# Patient Record
Sex: FEMALE | Race: WHITE | NOT HISPANIC OR LATINO | Employment: PART TIME | URBAN - METROPOLITAN AREA
[De-identification: names, ages, dates, MRNs, and addresses within clinical notes are randomized per-mention and may not be internally consistent; named-entity substitution may affect disease eponyms.]

---

## 2017-05-11 ENCOUNTER — CONSULT (OUTPATIENT)
Dept: CARDIOLOGY | Facility: CLINIC | Age: 62
End: 2017-05-11

## 2017-05-11 ENCOUNTER — HOSPITAL ENCOUNTER (OUTPATIENT)
Dept: CARDIOLOGY | Facility: HOSPITAL | Age: 62
Discharge: HOME | End: 2017-05-11
Admitting: NURSE PRACTITIONER

## 2017-05-11 VITALS
SYSTOLIC BLOOD PRESSURE: 142 MMHG | HEART RATE: 60 BPM | BODY MASS INDEX: 26.66 KG/M2 | DIASTOLIC BLOOD PRESSURE: 92 MMHG | HEIGHT: 65 IN | WEIGHT: 160 LBS

## 2017-05-11 DIAGNOSIS — I77.9 CAROTID DISEASE, BILATERAL (HCC): ICD-10-CM

## 2017-05-11 DIAGNOSIS — I10 ESSENTIAL HYPERTENSION: ICD-10-CM

## 2017-05-11 DIAGNOSIS — I77.9 CAROTID DISEASE, BILATERAL (HCC): Primary | ICD-10-CM

## 2017-05-11 LAB
BH CV XLRA MEAS LEFT DIST CCA EDV: 30 CM/SEC
BH CV XLRA MEAS LEFT DIST CCA PSV: 77 CM/SEC
BH CV XLRA MEAS LEFT DIST ICA EDV: 31 CM/SEC
BH CV XLRA MEAS LEFT DIST ICA PSV: 72 CM/SEC
BH CV XLRA MEAS LEFT ICA/CCA RATIO: 1.1
BH CV XLRA MEAS LEFT MID ICA EDV: 35 CM/SEC
BH CV XLRA MEAS LEFT MID ICA PSV: 87 CM/SEC
BH CV XLRA MEAS LEFT PROX CCA EDV: 25 CM/SEC
BH CV XLRA MEAS LEFT PROX CCA PSV: 85 CM/SEC
BH CV XLRA MEAS LEFT PROX ECA EDV: 13 CM/SEC
BH CV XLRA MEAS LEFT PROX ECA PSV: 56 CM/SEC
BH CV XLRA MEAS LEFT PROX ICA EDV: 28 CM/SEC
BH CV XLRA MEAS LEFT PROX ICA PSV: 84 CM/SEC
BH CV XLRA MEAS LEFT PROX SCLA EDV: 1.6 CM/SEC
BH CV XLRA MEAS LEFT PROX SCLA PSV: 123 CM/SEC
BH CV XLRA MEAS LEFT VERTEBRAL A EDV: 20 CM/SEC
BH CV XLRA MEAS LEFT VERTEBRAL A PSV: 51 CM/SEC
BH CV XLRA MEAS RIGHT DIST CCA EDV: 25 CM/SEC
BH CV XLRA MEAS RIGHT DIST CCA PSV: 62 CM/SEC
BH CV XLRA MEAS RIGHT DIST ICA EDV: 32 CM/SEC
BH CV XLRA MEAS RIGHT DIST ICA PSV: 80 CM/SEC
BH CV XLRA MEAS RIGHT ICA/CCA RATIO: 1
BH CV XLRA MEAS RIGHT MID ICA EDV: 32 CM/SEC
BH CV XLRA MEAS RIGHT MID ICA PSV: 65 CM/SEC
BH CV XLRA MEAS RIGHT PROX CCA EDV: 25 CM/SEC
BH CV XLRA MEAS RIGHT PROX CCA PSV: 97 CM/SEC
BH CV XLRA MEAS RIGHT PROX ECA EDV: 12 CM/SEC
BH CV XLRA MEAS RIGHT PROX ECA PSV: 50 CM/SEC
BH CV XLRA MEAS RIGHT PROX ICA EDV: 27 CM/SEC
BH CV XLRA MEAS RIGHT PROX ICA PSV: 64 CM/SEC
BH CV XLRA MEAS RIGHT PROX SCLA EDV: 0.79 CM/SEC
BH CV XLRA MEAS RIGHT PROX SCLA PSV: 98 CM/SEC
BH CV XLRA MEAS RIGHT VERTEBRAL A EDV: 19 CM/SEC
BH CV XLRA MEAS RIGHT VERTEBRAL A PSV: 48 CM/SEC
LEFT ARM BP: NORMAL MMHG
RIGHT ARM BP: NORMAL MMHG

## 2017-05-11 PROCEDURE — 99204 OFFICE O/P NEW MOD 45 MIN: CPT | Performed by: INTERNAL MEDICINE

## 2017-05-11 PROCEDURE — 93880 EXTRACRANIAL BILAT STUDY: CPT

## 2017-05-11 PROCEDURE — 93880 EXTRACRANIAL BILAT STUDY: CPT | Performed by: INTERNAL MEDICINE

## 2017-05-11 PROCEDURE — 93000 ELECTROCARDIOGRAM COMPLETE: CPT | Performed by: INTERNAL MEDICINE

## 2017-05-11 RX ORDER — SIMVASTATIN 10 MG
10 TABLET ORAL NIGHTLY
COMMUNITY
End: 2022-05-10 | Stop reason: SDDI

## 2017-05-11 RX ORDER — ASPIRIN 81 MG/1
81 TABLET ORAL DAILY
COMMUNITY

## 2017-05-11 RX ORDER — LEVOTHYROXINE SODIUM 0.05 MG/1
50 TABLET ORAL DAILY
COMMUNITY

## 2017-05-11 RX ORDER — MELOXICAM 7.5 MG/1
7.5 TABLET ORAL DAILY
COMMUNITY
End: 2022-09-28 | Stop reason: SDDI

## 2017-05-11 RX ORDER — CARVEDILOL 6.25 MG/1
6.25 TABLET ORAL 2 TIMES DAILY WITH MEALS
COMMUNITY
End: 2022-09-28

## 2017-05-11 RX ORDER — ESTRADIOL 1 MG/1
1 TABLET ORAL DAILY
COMMUNITY

## 2022-05-09 NOTE — PROGRESS NOTES
[facility] Cardiology    Patient ID: Halie Price is a 67 y.o. female.  : 1955   Contact: [phone]    Encounter date: 05/10/2022    PCP: Jorge Motley MD      Chief complaint:   Chief Complaint   Patient presents with   • Hypertension   • Carotid Dz       Problem List:  1. Episodes of vibration underneath the left breast x4 days, 2014.  2. Hypertension.  a.  Echocardiogram, 2009, Dr. Hawkins:  LVEF 65% with mild MR and mild TR.  Normal cardiac chamber dimensions.  No pericardial effusion.  b. Stress Myoview, 2009:  Exercise duration of 7 minutes and 6 seconds with no EKG changes.  No evidence  of inducible ischemia.  No arrhythmia.  3. Mild carotid artery disease.  a. Carotid duplex, 2009:  A 30% LICA stenosis and 30% to 40% OSMIN stenosis.  b. Carotid duplex, 17: 0-49% stenosis to bilateral carotids. Minimal carotid artery disease, < 15%.   4. Palpitations.  5. Bilateral toe pain.  6. Surgical history.  a. Hysterectomy, 2005.  b. Right total hip replacement, 2007.  c. Redo right total hip replacement, 2011.    Allergies   Allergen Reactions   • Sulfa Antibiotics        Current Medications:    Current Outpatient Medications:   •  aspirin 81 MG EC tablet, Take 81 mg by mouth Daily., Disp: , Rfl:   •  estradiol (ESTRACE) 1 MG tablet, Take 1 mg by mouth Daily., Disp: , Rfl:   •  levothyroxine (SYNTHROID, LEVOTHROID) 50 MCG tablet, Take 50 mcg by mouth Daily., Disp: , Rfl:   •  Loratadine 10 MG capsule, Take 1 capsule by mouth Daily., Disp: , Rfl:   •  losartan (COZAAR) 50 MG tablet, , Disp: , Rfl:   •  meloxicam (MOBIC) 7.5 MG tablet, Take 7.5 mg by mouth Daily., Disp: , Rfl:   •  rosuvastatin (CRESTOR) 10 MG tablet, , Disp: , Rfl:   •  carvedilol (COREG) 6.25 MG tablet, Take 6.25 mg by mouth 2 (Two) Times a Day With Meals., Disp: , Rfl:   •  simvastatin (ZOCOR) 10 MG tablet, Take 10 mg by mouth Every Night., Disp: ,  "Rfl:     [facility]    Halie Price is a 67 y.o. female who presents today for a follow up of bilateral carotid disease and cardiac risk factors. Since last visit, patient has been doing well overall from a cardiovascular standpoint. She saw Dr. Rowe last year, but states that she but did not get much clarification on the test that were ordered.  She reports that she had an echocardiogram with him, but is unsure of the results-data deficit. She has not had a stress test. She experiences exertional dyspnea. She reports that her upper eyelids have started to be swollen when she wakes in the morning. She reports that she experiences mild lower extremity edema.  Her brother had congestive heart failure and stent placement as well as carotid surgery.  She is concerned because he has had similar symptoms at times.  She also reports that her toes will \"feel like they're going to fall off.\" She reports that she will walk on the treadmill, and can do 20 minutes at a time.  When she walks at 2.5 mph, but is unable to go faster due to hip pain. She experiences bruising in her ankles. Patient denies chest pain, orthopnea, palpitations, dizziness, and syncope. She has had no interim ER visits, hospitalizations, serious illnesses, or surgeries.  Dr. Motley follows her cholesterol levels. Patient expresses concern about her legs.      The following portions of the patient's history were reviewed and updated as appropriate: allergies, current medications and problem list.    Pertinent positives as listed in the HPI.  All other systems reviewed are negative.         Vitals:    05/10/22 1105   BP: 112/62   BP Location: Right arm   Patient Position: Sitting   Pulse: 66   SpO2: 95%   Weight: 71.7 kg (158 lb)   Height: 165.1 cm (65\")       Physical Exam:  General: Alert and oriented.  Neck: Jugular venous pressure is within normal limits. Carotids have normal upstrokes without bruits.   Cardiovascular: Heart has a nondisplaced focal " PMI. Regular rate and rhythm. No murmur, gallop or rub.  Lungs: Clear, no rales or wheezes. Equal expansion is noted.   Extremities: Show no edema.  Nonpalpable pulses but feet are warm  Skin: Warm and dry.  Neurologic: Nonfocal.     Diagnostic Data (reviewed with patient):    No recent laboratory studies available for review today.      ECG 12 Lead    Date/Time: 5/10/2022 12:12 PM  Performed by: Donna Major MD  Authorized by: Donna Major MD   Comparison: compared with previous ECG from 5/11/2017  Similar to previous ECG  Rhythm: sinus rhythm  Rate: normal  BPM: 65    Clinical impression: abnormal EKG  Comments: Cannot rule out Anterior infarct, age undetermined.  QTc 432 ms              Assessment:    ICD-10-CM ICD-9-CM   1. Bilateral carotid artery disease, unspecified type (HCC)  I77.9 447.9   2. Primary hypertension  I10 401.9   3. Mixed hyperlipidemia  E78.2 272.2         Plan:  1. Exercise SAM today as the pulses are nonpalpable.    2. Obtain outside echocardiogram  3. Patient is encouraged to attempt aerobic exercise at least 30 minutes daily, 4-5 days per week.   4. Continue on simvastatin 10 mg daily for hyperlipidemia.   5. Continue on aspirin 81 mg for antiplatelet therapy.   6. Continue on carvedilol 6.25 mg BID for hypertension.   7. Continue all other current medications.  8. F/up in 3 months, sooner if needed.      Scribed for Donna Major MD by Bastheva Arita. 5/10/2022  11:28 EDT     I Donna Major MD personally performed the services described in this documentation as scribed by the above individual in my presence, and it is both accurate and complete.    Donna Major MD, [facility]

## 2022-05-10 ENCOUNTER — HOSPITAL ENCOUNTER (OUTPATIENT)
Dept: CARDIOLOGY | Facility: HOSPITAL | Age: 67
Discharge: HOME | End: 2022-05-10
Admitting: INTERNAL MEDICINE

## 2022-05-10 ENCOUNTER — OFFICE VISIT (OUTPATIENT)
Dept: CARDIOLOGY | Facility: CLINIC | Age: 67
End: 2022-05-10

## 2022-05-10 VITALS
HEIGHT: 65 IN | OXYGEN SATURATION: 95 % | DIASTOLIC BLOOD PRESSURE: 62 MMHG | SYSTOLIC BLOOD PRESSURE: 112 MMHG | BODY MASS INDEX: 26.33 KG/M2 | WEIGHT: 158 LBS | HEART RATE: 66 BPM

## 2022-05-10 VITALS — BODY MASS INDEX: 26.34 KG/M2 | WEIGHT: 158.07 LBS | HEIGHT: 65 IN

## 2022-05-10 DIAGNOSIS — I73.9 CLAUDICATION: ICD-10-CM

## 2022-05-10 DIAGNOSIS — R09.89 DIMINISHED PULSES IN LOWER EXTREMITY: ICD-10-CM

## 2022-05-10 DIAGNOSIS — I77.9 BILATERAL CAROTID ARTERY DISEASE, UNSPECIFIED TYPE: Primary | ICD-10-CM

## 2022-05-10 DIAGNOSIS — E78.2 MIXED HYPERLIPIDEMIA: ICD-10-CM

## 2022-05-10 DIAGNOSIS — R00.2 PALPITATIONS: ICD-10-CM

## 2022-05-10 DIAGNOSIS — I10 PRIMARY HYPERTENSION: ICD-10-CM

## 2022-05-10 LAB
BH CV LOWER ARTERIAL LEFT ABI RATIO: 1.07
BH CV LOWER ARTERIAL LEFT DORSALIS PEDIS SYS MAX: 173
BH CV LOWER ARTERIAL LEFT GREAT TOE SYS MAX: 59
BH CV LOWER ARTERIAL LEFT POST TIBIAL SYS MAX: 167
BH CV LOWER ARTERIAL LEFT TBI RATIO: 0.37
BH CV LOWER ARTERIAL RIGHT ABI RATIO: 1.03
BH CV LOWER ARTERIAL RIGHT DORSALIS PEDIS SYS MAX: 166
BH CV LOWER ARTERIAL RIGHT GREAT TOE SYS MAX: 98
BH CV LOWER ARTERIAL RIGHT POST TIBIAL SYS MAX: 155
BH CV LOWER ARTERIAL RIGHT TBI RATIO: 0.61
MAXIMAL PREDICTED HEART RATE: 154 BPM
STRESS TARGET HR: 131 BPM
UPPER ARTERIAL LEFT ARM BRACHIAL SYS MAX: 157
UPPER ARTERIAL RIGHT ARM BRACHIAL SYS MAX: 161

## 2022-05-10 PROCEDURE — 99204 OFFICE O/P NEW MOD 45 MIN: CPT | Performed by: INTERNAL MEDICINE

## 2022-05-10 PROCEDURE — 93000 ELECTROCARDIOGRAM COMPLETE: CPT | Performed by: INTERNAL MEDICINE

## 2022-05-10 PROCEDURE — 93922 UPR/L XTREMITY ART 2 LEVELS: CPT

## 2022-05-10 PROCEDURE — 93922 UPR/L XTREMITY ART 2 LEVELS: CPT | Performed by: INTERNAL MEDICINE

## 2022-05-10 RX ORDER — LORATADINE 10 MG/1
1 CAPSULE, LIQUID FILLED ORAL DAILY
COMMUNITY

## 2022-05-10 RX ORDER — LOSARTAN POTASSIUM 50 MG/1
TABLET ORAL
COMMUNITY
Start: 2022-05-06 | End: 2022-09-28 | Stop reason: SDUPTHER

## 2022-05-10 RX ORDER — ROSUVASTATIN CALCIUM 10 MG/1
TABLET, COATED ORAL
COMMUNITY
Start: 2022-05-06

## 2022-05-20 ENCOUNTER — TELEPHONE (OUTPATIENT)
Dept: CARDIOLOGY | Facility: CLINIC | Age: 67
End: 2022-05-20

## 2022-09-02 ENCOUNTER — APPOINTMENT (OUTPATIENT)
Dept: CARDIOLOGY | Facility: HOSPITAL | Age: 67
End: 2022-09-02

## 2022-09-16 ENCOUNTER — HOSPITAL ENCOUNTER (OUTPATIENT)
Dept: CARDIOLOGY | Facility: HOSPITAL | Age: 67
Discharge: HOME | End: 2022-09-16
Admitting: INTERNAL MEDICINE

## 2022-09-16 VITALS — WEIGHT: 158 LBS | BODY MASS INDEX: 26.33 KG/M2 | HEIGHT: 65 IN

## 2022-09-16 DIAGNOSIS — I10 PRIMARY HYPERTENSION: ICD-10-CM

## 2022-09-16 DIAGNOSIS — R00.2 PALPITATIONS: ICD-10-CM

## 2022-09-16 LAB
BH CV ECHO MEAS - AO MAX PG: 6.2 MMHG
BH CV ECHO MEAS - AO MEAN PG: 3 MMHG
BH CV ECHO MEAS - AO ROOT DIAM: 3.5 CM
BH CV ECHO MEAS - AO V2 MAX: 124 CM/SEC
BH CV ECHO MEAS - AO V2 VTI: 28 CM
BH CV ECHO MEAS - AVA(I,D): 1.99 CM2
BH CV ECHO MEAS - EDV(CUBED): 85.2 ML
BH CV ECHO MEAS - EDV(MOD-SP2): 64.9 ML
BH CV ECHO MEAS - EDV(MOD-SP4): 117 ML
BH CV ECHO MEAS - EF(MOD-BP): 56.4 %
BH CV ECHO MEAS - EF(MOD-SP2): 56.7 %
BH CV ECHO MEAS - EF(MOD-SP4): 56.3 %
BH CV ECHO MEAS - ESV(CUBED): 19.7 ML
BH CV ECHO MEAS - ESV(MOD-SP2): 28.1 ML
BH CV ECHO MEAS - ESV(MOD-SP4): 51.1 ML
BH CV ECHO MEAS - FS: 38.6 %
BH CV ECHO MEAS - IVS/LVPW: 0.67 CM
BH CV ECHO MEAS - IVSD: 0.8 CM
BH CV ECHO MEAS - LA DIMENSION: 3 CM
BH CV ECHO MEAS - LAT PEAK E' VEL: 11.9 CM/SEC
BH CV ECHO MEAS - LV DIASTOLIC VOL/BSA (35-75): 65.4 CM2
BH CV ECHO MEAS - LV MASS(C)D: 147.8 GRAMS
BH CV ECHO MEAS - LV MAX PG: 2.6 MMHG
BH CV ECHO MEAS - LV MEAN PG: 1 MMHG
BH CV ECHO MEAS - LV SYSTOLIC VOL/BSA (12-30): 28.6 CM2
BH CV ECHO MEAS - LV V1 MAX: 81.2 CM/SEC
BH CV ECHO MEAS - LV V1 VTI: 17.7 CM
BH CV ECHO MEAS - LVIDD: 4.4 CM
BH CV ECHO MEAS - LVIDS: 2.7 CM
BH CV ECHO MEAS - LVOT AREA: 3.1 CM2
BH CV ECHO MEAS - LVOT DIAM: 2 CM
BH CV ECHO MEAS - LVPWD: 1.2 CM
BH CV ECHO MEAS - MED PEAK E' VEL: 9.3 CM/SEC
BH CV ECHO MEAS - MV A MAX VEL: 79.1 CM/SEC
BH CV ECHO MEAS - MV DEC SLOPE: 271 CM/SEC2
BH CV ECHO MEAS - MV E MAX VEL: 73.9 CM/SEC
BH CV ECHO MEAS - MV E/A: 0.93
BH CV ECHO MEAS - MV MAX PG: 4.6 MMHG
BH CV ECHO MEAS - MV MEAN PG: 2 MMHG
BH CV ECHO MEAS - MV P1/2T: 98.5 MSEC
BH CV ECHO MEAS - MV V2 VTI: 29.5 CM
BH CV ECHO MEAS - MVA(P1/2T): 2.23 CM2
BH CV ECHO MEAS - MVA(VTI): 1.88 CM2
BH CV ECHO MEAS - PA ACC TIME: 0.12 SEC
BH CV ECHO MEAS - PA PR(ACCEL): 25 MMHG
BH CV ECHO MEAS - SI(MOD-SP2): 20.6 ML/M2
BH CV ECHO MEAS - SI(MOD-SP4): 36.8 ML/M2
BH CV ECHO MEAS - SV(LVOT): 55.6 ML
BH CV ECHO MEAS - SV(MOD-SP2): 36.8 ML
BH CV ECHO MEAS - SV(MOD-SP4): 65.9 ML
BH CV ECHO MEAS - TAPSE (>1.6): 1.3 CM
BH CV ECHO MEAS - TR MAX PG: 22 MMHG
BH CV ECHO MEAS - TR MAX VEL: 234.5 CM/SEC
BH CV ECHO MEASUREMENTS AVERAGE E/E' RATIO: 6.97
BH CV VAS BP LEFT ARM: NORMAL MMHG
BH CV XLRA - RV BASE: 3.2 CM
BH CV XLRA - RV LENGTH: 6 CM
BH CV XLRA - RV MID: 2.7 CM
BH CV XLRA - TDI S': 16.1 CM/SEC
LEFT ATRIUM VOLUME INDEX: 15.7 ML/M2
MAXIMAL PREDICTED HEART RATE: 154 BPM
STRESS TARGET HR: 131 BPM

## 2022-09-16 PROCEDURE — 93306 TTE W/DOPPLER COMPLETE: CPT

## 2022-09-16 PROCEDURE — 93306 TTE W/DOPPLER COMPLETE: CPT | Performed by: INTERNAL MEDICINE

## 2022-09-28 ENCOUNTER — OFFICE VISIT (OUTPATIENT)
Dept: CARDIOLOGY | Facility: CLINIC | Age: 67
End: 2022-09-28

## 2022-09-28 VITALS
SYSTOLIC BLOOD PRESSURE: 110 MMHG | HEART RATE: 78 BPM | WEIGHT: 163 LBS | OXYGEN SATURATION: 97 % | HEIGHT: 65 IN | BODY MASS INDEX: 27.16 KG/M2 | DIASTOLIC BLOOD PRESSURE: 78 MMHG

## 2022-09-28 DIAGNOSIS — I10 PRIMARY HYPERTENSION: ICD-10-CM

## 2022-09-28 DIAGNOSIS — I77.9 BILATERAL CAROTID ARTERY DISEASE, UNSPECIFIED TYPE: Primary | ICD-10-CM

## 2022-09-28 PROBLEM — I21.9 MYOCARDIAL INFARCTION (HCC): Status: ACTIVE | Noted: 2022-09-28

## 2022-09-28 PROBLEM — I21.9 MYOCARDIAL INFARCTION (HCC): Status: RESOLVED | Noted: 2022-09-28 | Resolved: 2022-09-28

## 2022-09-28 PROCEDURE — 99213 OFFICE O/P EST LOW 20 MIN: CPT | Performed by: NURSE PRACTITIONER

## 2022-09-28 RX ORDER — LOSARTAN POTASSIUM 50 MG/1
50 TABLET ORAL 2 TIMES DAILY
Qty: 180 TABLET | Refills: 3 | Status: SHIPPED | OUTPATIENT
Start: 2022-09-28

## 2022-09-28 RX ORDER — FLUTICASONE PROPIONATE 50 MCG
2 SPRAY, SUSPENSION (ML) NASAL
COMMUNITY
Start: 2022-08-12

## 2022-09-28 NOTE — PROGRESS NOTES
[facility] Cardiology    Patient ID: Halie Price is a 67 y.o. female.  : 1955   Contact: [phone]    Encounter date: 05/10/2022    PCP: Jorge Motley MD      Chief complaint:   Chief Complaint   Patient presents with   • Bilateral Carotid DZ   • Hypertension     Problem List:  1. Hypertension.  2. Chest pain  a.  Echocardiogram, 2009, Dr. Hawkins:  LVEF 65% with mild MR and mild TR.  Normal cardiac chamber dimensions.  No pericardial effusion.  b. Stress Myoview, 2009:  Exercise duration of 7 minutes and 6 seconds with no EKG changes.  No evidence  of inducible ischemia.  No arrhythmia.  c. Echo 22: EF 56-60%, no sig VHD.  3. Absent pedal pulses  1. Exercise SAM 5/10/22, [facility]: Normal ABIs.  4. Mild carotid artery disease.  a. Carotid duplex, 2009:  A 30% LICA stenosis and 30% to 40% OSMIN stenosis.  b. Carotid duplex, 17: 0-49% stenosis to bilateral carotids. Minimal carotid artery disease, < 15%.   5. Palpitations.  6. Bilateral toe pain.  7. Surgical history.  a. Hysterectomy, 2005.  b. Right total hip replacement, 2007.  c. Redo right total hip replacement, 2011.    Allergies   Allergen Reactions   • Sulfa Antibiotics        Current Medications:    Current Outpatient Medications:   •  aspirin 81 MG EC tablet, Take 81 mg by mouth Daily., Disp: , Rfl:   •  estradiol (ESTRACE) 1 MG tablet, Take 1 mg by mouth Daily., Disp: , Rfl:   •  fluticasone (Flonase Allergy Relief) 50 MCG/ACT nasal spray, 2 sprays into the nostril(s) as directed by provider., Disp: , Rfl:   •  levothyroxine (SYNTHROID, LEVOTHROID) 50 MCG tablet, Take 50 mcg by mouth Daily., Disp: , Rfl:   •  Loratadine 10 MG capsule, Take 1 capsule by mouth Daily., Disp: , Rfl:   •  losartan (COZAAR) 50 MG tablet, Take 1 tablet by mouth 2 (Two) Times a Day., Disp: 180 tablet, Rfl: 3  •  rosuvastatin (CRESTOR) 10 MG tablet, , Disp: , Rfl:  "    HPI    Halie Price is a 67 y.o. female who presents today for a follow up of bilateral carotid disease and cardiac risk factors. Since last visit, patient has done well from a cardiac standpoint. Her BP has been mostly 190's systolic per her recollection. Her PCP recently took her off Coreg and put her on Losartan to help her BP. First she was started on 25 mg po daily then increased to 50 mg po daily. She has been on this dose for awhile now. She denies any chest pain, shortness of breath, PND, orthopnea, edema, palpitations. She is concerned that her last EKG reported \"cannot rule out anterior infarct\".       The following portions of the patient's history were reviewed and updated as appropriate: allergies, current medications and problem list.    Pertinent positives as listed in the HPI.  All other systems reviewed are negative.         Vitals:    09/28/22 1044   BP: 110/78   BP Location: Left arm   Patient Position: Sitting   Pulse: 78   SpO2: 97%   Weight: 73.9 kg (163 lb)   Height: 165.1 cm (65\")       Physical Exam:  General: Alert and oriented.  Neck: Jugular venous pressure is within normal limits. Carotids have normal upstrokes without bruits.   Cardiovascular: Heart has a nondisplaced focal PMI. Regular rate and rhythm. No murmur, gallop or rub.  Lungs: Clear, no rales or wheezes. Equal expansion is noted.   Extremities: Show no edema.  Nonpalpable pulses but feet are warm  Skin: Warm and dry.  Neurologic: Nonfocal.     Diagnostic Data (reviewed with patient):    No recent laboratory studies available for review today.    Procedures      Assessment:    ICD-10-CM ICD-9-CM   1. Bilateral carotid artery disease, unspecified type (HCC)  I77.9 447.9   2. Primary hypertension  I10 401.9         Plan:  1. Reviewed last two EKGs with patient and reassured her that this does not necessarily indicate that she has had a prior MI. We do not have any documentation of her having a MI. That has not been " a significant change between the two.   2. Patient is encouraged to attempt aerobic exercise at least 30 minutes daily, 4-5 days per week.   3. Continue on simvastatin 10 mg daily for hyperlipidemia. PCP following FLP every 6 months. Defer management to him.   4. I personally checked her BP and got 142/78 mmHg. She also reports that her BP is elevated at home, worse in the evenings. Therefore, we will try Losartan 50 mg bid. She can call us in one week with an update.   5. Continue on aspirin 81 mg for antiplatelet therapy.   6. Continue all other current medications.  7. F/up in 6 months, sooner if needed.        Electronically signed by DRAKE Martinez, 09/28/22, 10:29 AM EDT.

## 2024-12-30 ENCOUNTER — OFFICE VISIT (OUTPATIENT)
Dept: ORTHOPEDIC SURGERY | Facility: CLINIC | Age: 69
End: 2024-12-30
Payer: MEDICARE

## 2024-12-30 ENCOUNTER — HOSPITAL ENCOUNTER (OUTPATIENT)
Dept: GENERAL RADIOLOGY | Facility: HOSPITAL | Age: 69
Discharge: HOME | End: 2024-12-30
Admitting: PHYSICIAN ASSISTANT
Payer: MEDICARE

## 2024-12-30 VITALS
BODY MASS INDEX: 25.16 KG/M2 | HEIGHT: 65 IN | SYSTOLIC BLOOD PRESSURE: 160 MMHG | WEIGHT: 151 LBS | DIASTOLIC BLOOD PRESSURE: 96 MMHG | HEART RATE: 71 BPM

## 2024-12-30 DIAGNOSIS — M25.551 RIGHT HIP PAIN: ICD-10-CM

## 2024-12-30 DIAGNOSIS — M25.562 LEFT KNEE PAIN, UNSPECIFIED CHRONICITY: ICD-10-CM

## 2024-12-30 DIAGNOSIS — M25.552 LEFT HIP PAIN: ICD-10-CM

## 2024-12-30 DIAGNOSIS — M54.50 LUMBAR BACK PAIN: Primary | ICD-10-CM

## 2024-12-30 PROCEDURE — 3077F SYST BP >= 140 MM HG: CPT | Performed by: PHYSICIAN ASSISTANT

## 2024-12-30 PROCEDURE — 99203 OFFICE O/P NEW LOW 30 MIN: CPT | Performed by: PHYSICIAN ASSISTANT

## 2024-12-30 PROCEDURE — 3080F DIAST BP >= 90 MM HG: CPT | Performed by: PHYSICIAN ASSISTANT

## 2024-12-30 PROCEDURE — 73502 X-RAY EXAM HIP UNI 2-3 VIEWS: CPT

## 2024-12-30 RX ORDER — LOSARTAN POTASSIUM 25 MG/1
TABLET ORAL
COMMUNITY
Start: 2024-10-29

## 2024-12-30 RX ORDER — NAPROXEN 500 MG/1
TABLET ORAL
COMMUNITY

## 2024-12-30 RX ORDER — TRAMADOL HYDROCHLORIDE 50 MG/1
TABLET ORAL
COMMUNITY
Start: 2024-12-12

## 2024-12-30 RX ORDER — GABAPENTIN 100 MG/1
CAPSULE ORAL
COMMUNITY
Start: 2024-12-20

## 2024-12-30 NOTE — PROGRESS NOTES
[facility] Orthopaedic Surgery New Patient Visit          Patient: Halie Price  YOB: 1955  Date of Encounter: 2024  PCP: Jorge Motley MD      Subjective     Chief Complaint   Patient presents with    Left Hip - Pain           History of Present Illness:     Halie Price is a 69 y.o. female presents today as result of right hip pain and left knee pain.  Patient reports 2 prior total hip arthroplasties right hip.  She is having trouble and difficulty with ambulation and weightbearing with normal daily activities giving her trouble.  She presents today for further diagnostic imaging and further evaluation.  Patient has occasionally been taking anti-inflammatory medication that has been of minimal benefit.  She reports dull throbbing aching sensation to the anterior lateral right hip progressing down to the lower extremity.  She has longstanding lower lumbar back pain which seems to be giving her difficulty as well patient also has intermittent left knee pain.        Patient Active Problem List   Diagnosis    Incomplete emptying of bladder    Hypertension    Hypothyroidism    Arthritis    Carotid disease, bilateral     Past Medical History:   Diagnosis Date    Arthritis     Carotid disease, bilateral 2017    Cystocele     Hypertension     Hypothyroidism     Incomplete emptying of bladder     Myocardial infarction     Rectocele     Urinary frequency     Weak urine stream      Past Surgical History:   Procedure Laterality Date    BLADDER SURGERY      HYSTERECTOMY      TOTAL HIP ARTHROPLASTY       Social History     Occupational History    Not on file   Tobacco Use    Smoking status: Former     Current packs/day: 0.00     Types: Cigarettes     Quit date: 1987     Years since quittin.7    Smokeless tobacco: Never   Vaping Use    Vaping status: Never Used   Substance and Sexual Activity    Alcohol use: No    Drug use: No    Sexual activity: Fadi Ambrose  Stanley Price  reports that she quit smoking about 37 years ago. Her smoking use included cigarettes. She has never used smokeless tobacco. I have educated her on the risk of diseases from using tobacco products such as cancer, COPD, and heart disease.          Social History     Social History Narrative    Not on file     Family History   Problem Relation Age of Onset    Diabetes Mother     Stroke Mother     Diabetes Maternal Grandmother     Diabetes Other     Cancer Father      Current Outpatient Medications   Medication Sig Dispense Refill    aspirin 81 MG EC tablet Take 1 tablet by mouth Daily.      estradiol (ESTRACE) 1 MG tablet Take 1 tablet by mouth Daily.      fluticasone (Flonase Allergy Relief) 50 MCG/ACT nasal spray Administer 2 sprays into the nostril(s) as directed by provider.      gabapentin (NEURONTIN) 100 MG capsule       levothyroxine (SYNTHROID, LEVOTHROID) 50 MCG tablet Take 1 tablet by mouth Daily.      losartan (COZAAR) 50 MG tablet Take 1 tablet by mouth 2 (Two) Times a Day. 180 tablet 3    naproxen (NAPROSYN) 500 MG tablet       rosuvastatin (CRESTOR) 10 MG tablet       traMADol (ULTRAM) 50 MG tablet       Loratadine 10 MG capsule Take 1 capsule by mouth Daily.      losartan (COZAAR) 25 MG tablet  (Patient not taking: Reported on 12/30/2024)       No current facility-administered medications for this visit.     Allergies   Allergen Reactions    Sulfa Antibiotics             Review of Systems   Constitutional: Negative.   HENT: Negative.     Eyes: Negative.    Cardiovascular: Negative.    Respiratory: Negative.     Endocrine: Negative.    Hematologic/Lymphatic: Bruises/bleeds easily.   Skin: Negative.    Musculoskeletal:  Positive for back pain, joint pain, joint swelling and neck pain.        Pertinent positives listed in HPI   Gastrointestinal: Negative.    Genitourinary:  Positive for urgency.   Neurological: Negative.    Psychiatric/Behavioral: Negative.     Allergic/Immunologic:  "Negative.          Objective      Vitals:    12/30/24 0858   BP: 160/96   BP Location: Right arm   Patient Position: Sitting   Cuff Size: Adult   Pulse: 71   Weight: 68.5 kg (151 lb)   Height: 165.1 cm (65\")             Physical Exam  Vitals and nursing note reviewed.   Constitutional:       General: She is not in acute distress.     Appearance: She is not ill-appearing.   HENT:      Head: Normocephalic and atraumatic.      Right Ear: External ear normal.      Left Ear: External ear normal.      Nose: Nose normal. No congestion or rhinorrhea.   Eyes:      Extraocular Movements: Extraocular movements intact.      Conjunctiva/sclera: Conjunctivae normal.      Pupils: Pupils are equal, round, and reactive to light.   Cardiovascular:      Rate and Rhythm: Normal rate.      Pulses: Normal pulses.   Pulmonary:      Effort: Pulmonary effort is normal. No respiratory distress.      Breath sounds: No stridor.   Abdominal:      General: There is no distension.   Musculoskeletal:      Cervical back: Normal range of motion.      Lumbar back: Spasms, tenderness and bony tenderness present. Decreased range of motion. Positive right straight leg raise test and positive left straight leg raise test.      Comments: Right hip reveals surgical incisions well-healed with good opening.  Patient exhibits forward flexion and abduction with normal adequate arc of rotation comparable to contralateral hip.  Lumbar spinal examination today revealing positive straight leg testing painful forward flexion   Skin:     General: Skin is warm and dry.      Capillary Refill: Capillary refill takes less than 2 seconds.   Neurological:      General: No focal deficit present.      Mental Status: She is alert and oriented to person, place, and time.   Psychiatric:         Mood and Affect: Mood normal.         Behavior: Behavior normal.         Thought Content: Thought content normal.         Judgment: Judgment normal.                 Radiology:        XR " Hip With or Without Pelvis 2 - 3 View Right    Result Date: 12/30/2024  1.  No acute fracture or dislocation. 2.  Right hip prosthesis. 3.  Arthritic change in the left hip.  This report was finalized on 12/30/2024 9:41 AM by Dr. Sina Corona MD.                 XR Hip With or Without Pelvis 2 - 3 View Right    Result Date: 12/30/2024  1.  No acute fracture or dislocation. 2.  Right hip prosthesis. 3.  Arthritic change in the left hip.  This report was finalized on 12/30/2024 9:41 AM by Dr. Sina Corona MD.         Assessment/Plan        ICD-10-CM ICD-9-CM   1. Lumbar back pain  M54.50 724.2   2. Right hip pain  M25.551 719.45   3. Left hip pain  M25.552 719.45   4. Left knee pain, unspecified chronicity  M25.562 719.46        69-year-old female with notable prior history of right hip prosthesis total hip arthroplasty x 2 with ongoing right lower extremity numbness and tingling and pain and symptoms thought to represent chronic lower lumbar radiculopathy.  Radiographs today of the hip reveals right hip prosthesis with no significant change in alignment or positioning of the prosthesis.  Left hip reveals arthritic change.  Secondary to the patient's pain and symptoms the patient will implement formal outpatient physical therapy over the course of the next 6 weeks implementing modalities as deemed appropriate in reference to the lumbar spine as well as the hip and knee.  Patient return back upon completion of this for further evaluation.                      This document was signed by Nilay Ashley PA-C December 30, 2024    CC: Jorge Motley MD      Dictated Utilizing Dragon Dictation:   Please note that portions of this note were completed with a voice recognition program.   Part of this note may be an electronic transcription/translation of spoken language to printed text using the Dragon Dictation System.

## 2025-02-21 ENCOUNTER — TELEPHONE (OUTPATIENT)
Dept: ORTHOPEDIC SURGERY | Facility: CLINIC | Age: 70
End: 2025-02-21
Payer: MEDICARE

## 2025-02-21 DIAGNOSIS — M54.50 LUMBAR BACK PAIN: Primary | ICD-10-CM

## 2025-02-21 DIAGNOSIS — M25.551 RIGHT HIP PAIN: ICD-10-CM

## 2025-02-21 DIAGNOSIS — M25.552 LEFT HIP PAIN: ICD-10-CM

## 2025-02-21 NOTE — TELEPHONE ENCOUNTER
Provider: CHINO CORONA    Caller: Halie Price    Relationship to Patient: Self    Phone Number: [phone]    Reason for Call: PATIENT CALLED STATING SHE NEEDS TO SCHEDULE BACK IN WITH PHYSICAL THERAPY BUT WAS TOLD ORDER IS ONLY GOOD FOR 30 DAYS. SHE NEEDS A NEW ORDER SENT TO [facility]. PLEASE ADVISE.